# Patient Record
Sex: MALE | Race: WHITE | Employment: OTHER | URBAN - METROPOLITAN AREA
[De-identification: names, ages, dates, MRNs, and addresses within clinical notes are randomized per-mention and may not be internally consistent; named-entity substitution may affect disease eponyms.]

---

## 2017-03-30 ENCOUNTER — MEDICAL CORRESPONDENCE (OUTPATIENT)
Dept: HEALTH INFORMATION MANAGEMENT | Facility: CLINIC | Age: 77
End: 2017-03-30

## 2018-07-13 ENCOUNTER — HOSPITAL ENCOUNTER (OUTPATIENT)
Dept: ULTRASOUND IMAGING | Facility: CLINIC | Age: 78
End: 2018-07-13
Attending: INTERNAL MEDICINE
Payer: MEDICARE

## 2018-07-13 ENCOUNTER — HOSPITAL ENCOUNTER (OUTPATIENT)
Dept: CT IMAGING | Facility: CLINIC | Age: 78
Discharge: HOME | End: 2018-07-13
Attending: INTERNAL MEDICINE | Admitting: INTERNAL MEDICINE
Payer: MEDICARE

## 2018-07-13 DIAGNOSIS — K76.7 HEPATORENAL SYNDROME (H): ICD-10-CM

## 2018-07-13 PROCEDURE — 74263 CT COLONOGRAPHY SCREENING: CPT

## 2018-07-13 PROCEDURE — 93975 VASCULAR STUDY: CPT | Mod: TC

## 2018-07-26 ENCOUNTER — SURGERY (OUTPATIENT)
Age: 78
End: 2018-07-26

## 2018-07-26 ENCOUNTER — HOSPITAL ENCOUNTER (OUTPATIENT)
Facility: CLINIC | Age: 78
Discharge: HOME | End: 2018-07-26
Attending: INTERNAL MEDICINE | Admitting: INTERNAL MEDICINE
Payer: MEDICARE

## 2018-07-26 VITALS
RESPIRATION RATE: 14 BRPM | SYSTOLIC BLOOD PRESSURE: 131 MMHG | OXYGEN SATURATION: 95 % | DIASTOLIC BLOOD PRESSURE: 60 MMHG

## 2018-07-26 LAB — COLONOSCOPY: NORMAL

## 2018-07-26 PROCEDURE — 45380 COLONOSCOPY AND BIOPSY: CPT | Performed by: INTERNAL MEDICINE

## 2018-07-26 PROCEDURE — 88305 TISSUE EXAM BY PATHOLOGIST: CPT | Mod: 26 | Performed by: INTERNAL MEDICINE

## 2018-07-26 PROCEDURE — 99153 MOD SED SAME PHYS/QHP EA: CPT | Performed by: INTERNAL MEDICINE

## 2018-07-26 PROCEDURE — 45385 COLONOSCOPY W/LESION REMOVAL: CPT | Performed by: INTERNAL MEDICINE

## 2018-07-26 PROCEDURE — 25000128 H RX IP 250 OP 636: Performed by: INTERNAL MEDICINE

## 2018-07-26 PROCEDURE — G0500 MOD SEDAT ENDO SERVICE >5YRS: HCPCS | Performed by: INTERNAL MEDICINE

## 2018-07-26 PROCEDURE — 88305 TISSUE EXAM BY PATHOLOGIST: CPT | Performed by: INTERNAL MEDICINE

## 2018-07-26 PROCEDURE — 45382 COLONOSCOPY W/CONTROL BLEED: CPT | Performed by: INTERNAL MEDICINE

## 2018-07-26 RX ORDER — HEPARIN SODIUM (PORCINE) LOCK FLUSH IV SOLN 100 UNIT/ML 100 UNIT/ML
5 SOLUTION INTRAVENOUS
Status: DISCONTINUED | OUTPATIENT
Start: 2018-07-26 | End: 2018-07-26 | Stop reason: HOSPADM

## 2018-07-26 RX ORDER — LIDOCAINE 40 MG/G
CREAM TOPICAL
Status: DISCONTINUED | OUTPATIENT
Start: 2018-07-26 | End: 2018-07-26 | Stop reason: HOSPADM

## 2018-07-26 RX ORDER — ONDANSETRON 2 MG/ML
4 INJECTION INTRAMUSCULAR; INTRAVENOUS
Status: DISCONTINUED | OUTPATIENT
Start: 2018-07-26 | End: 2018-07-26 | Stop reason: HOSPADM

## 2018-07-26 RX ORDER — FENTANYL CITRATE 50 UG/ML
INJECTION, SOLUTION INTRAMUSCULAR; INTRAVENOUS PRN
Status: DISCONTINUED | OUTPATIENT
Start: 2018-07-26 | End: 2018-07-26 | Stop reason: HOSPADM

## 2018-07-26 RX ORDER — LACTULOSE 10 G/15ML
10 SOLUTION ORAL 2 TIMES DAILY
COMMUNITY

## 2018-07-26 RX ORDER — ONDANSETRON 4 MG/1
4 TABLET, ORALLY DISINTEGRATING ORAL EVERY 6 HOURS PRN
Status: DISCONTINUED | OUTPATIENT
Start: 2018-07-26 | End: 2018-07-26 | Stop reason: HOSPADM

## 2018-07-26 RX ORDER — ONDANSETRON 2 MG/ML
4 INJECTION INTRAMUSCULAR; INTRAVENOUS EVERY 6 HOURS PRN
Status: DISCONTINUED | OUTPATIENT
Start: 2018-07-26 | End: 2018-07-26 | Stop reason: HOSPADM

## 2018-07-26 RX ORDER — FLUMAZENIL 0.1 MG/ML
0.2 INJECTION, SOLUTION INTRAVENOUS
Status: DISCONTINUED | OUTPATIENT
Start: 2018-07-26 | End: 2018-07-26 | Stop reason: HOSPADM

## 2018-07-26 RX ORDER — NALOXONE HYDROCHLORIDE 0.4 MG/ML
.1-.4 INJECTION, SOLUTION INTRAMUSCULAR; INTRAVENOUS; SUBCUTANEOUS
Status: DISCONTINUED | OUTPATIENT
Start: 2018-07-26 | End: 2018-07-26 | Stop reason: HOSPADM

## 2018-07-26 RX ADMIN — MIDAZOLAM 1 MG: 1 INJECTION INTRAMUSCULAR; INTRAVENOUS at 12:27

## 2018-07-26 RX ADMIN — FENTANYL CITRATE 50 MCG: 50 INJECTION, SOLUTION INTRAMUSCULAR; INTRAVENOUS at 12:23

## 2018-07-26 RX ADMIN — HEPARIN 5 ML: 100 SYRINGE at 14:05

## 2018-07-26 RX ADMIN — MIDAZOLAM 1 MG: 1 INJECTION INTRAMUSCULAR; INTRAVENOUS at 12:23

## 2018-07-26 NOTE — PROCEDURES
PRE-PROCEDURE H&P    CHIEF COMPLAINT / REASON FOR PROCEDURE:  Abnormal CT scan    PERTINENT HISTORY :    Past Medical History:   Diagnosis Date     Anemia      Cirrhosis of liver (H)     patient does not have cirrhosis-nodular regenerative hyperplasia = liver disease     Coronary artery disease     congenital myopathy, heart murmur     Diabetes mellitus (H)     type 2     History of blood transfusion      Hypertension     idiopathic portal hypertension     Inclusion body myositis      Portal hypertension syndrome (H)     idiopathic, [facility], Dr Sawyer     Weakness generalised     PMD aware & following up      Past Surgical History:   Procedure Laterality Date     ABDOMEN SURGERY      hernia repairs, kidny stone removal, bacterial peritonitis     ABDOMEN SURGERY  04/2013    [facility]     BIOPSY       BIOPSY/EXCISION LYMPH NODE(S), NEEDLE, SUPERFICIAL (CERVICAL/INGUINAL/AXILLARY) Left 11/8/2016    Procedure: BIOPSY/EXCISION LYMPH NODE(S), NEEDLE, SUPERFICIAL (CERVICAL/INGUINAL/AXILLARY);  Surgeon: Dario Cabrera MD;  Location:  GI     COLONOSCOPY  2012     COLONOSCOPY  07/26/2018    Dr. Logan DELATORRE     ENT SURGERY      T&A as a child     ESOPHAGOSCOPY, GASTROSCOPY, DUODENOSCOPY (EGD), COMBINED  12/26/2012    Procedure: COMBINED ESOPHAGOSCOPY, GASTROSCOPY, DUODENOSCOPY (EGD);  ESOPHAGOSCOPY, GASTROSCOPY, DUODENOSCOPY (EGD) ;  Surgeon: Greg Sawyer MD;  Location:  GI     HEAD & NECK SURGERY      1-2 wisdom teeth removed     INSERT PORT VASCULAR ACCESS  11/1/2012    Procedure: INSERT PORT VASCULAR ACCESS;  PORT PLACEMENT;  Surgeon: Dario Cabrera MD;  Location:  OR     PARACENTESIS           Bleeding tendencies:  No    Relevant Family History:  NONE     Relevant Social History:  NONE      A relevant review of systems was performed and was negative      ALLERGIES/SENSITIVITIES:   Allergies   Allergen Reactions     Aspirin      Pt can not take medication due to risk of bleeding with esophageal  varices.      Compazine [Prochlorperazine] Other (See Comments)     Arms and legs flial,      Coumadin      Pt can not take medication due to risk of bleeding with esophageal varices.     Ibuprofen Sodium      Pt can not take medication due to risk of bleeding with esophageal varices.     Trazodone Other (See Comments)     Severe weakness       CURRENT MEDICATIONS:   No current outpatient prescriptions on file.        PRE-SEDATION ASSESSMENT:    Lung Exam:  normal  Heart Exam:  normal  Airway Exam: normal  Previous reaction to anesthesia/sedation:   No  Sedation plan based on assessment: Moderate (conscious) sedation  ASA Classification:  3 - Severe systemic disease, but not incapacitating        IMPRESSION:  abnl CT colography    PLAN:  colonoscopy    Leesa Ford  [facility]  Office: [phone]

## 2018-07-27 LAB — COPATH REPORT: NORMAL

## (undated) DEVICE — ENDO TRAP POLYP QUICK CATCH 710201

## (undated) DEVICE — KIT ENDO TURNOVER/PROCEDURE W/CLEAN A SCOPE LINERS 103888

## (undated) DEVICE — ENDO FORCEP ENDOJAW BIOPSY 2.8MMX230CM FB-220U

## (undated) DEVICE — ENDO SNARE EXACTO COLD 9MM LOOP 2.4MMX230CM 00711115

## (undated) DEVICE — CLIP HEMOCLIP ENDOSCOPIC INSTINCT 2.8X230CM INSC-7-230-SS

## (undated) RX ORDER — FENTANYL CITRATE 50 UG/ML
INJECTION, SOLUTION INTRAMUSCULAR; INTRAVENOUS
Status: DISPENSED
Start: 2018-07-26

## (undated) RX ORDER — HEPARIN SODIUM (PORCINE) LOCK FLUSH IV SOLN 100 UNIT/ML 100 UNIT/ML
SOLUTION INTRAVENOUS
Status: DISPENSED
Start: 2018-07-26